# Patient Record
Sex: MALE | Race: ASIAN | NOT HISPANIC OR LATINO
[De-identification: names, ages, dates, MRNs, and addresses within clinical notes are randomized per-mention and may not be internally consistent; named-entity substitution may affect disease eponyms.]

---

## 2019-09-24 ENCOUNTER — APPOINTMENT (OUTPATIENT)
Dept: CARDIOLOGY | Facility: CLINIC | Age: 64
End: 2019-09-24

## 2019-09-24 ENCOUNTER — APPOINTMENT (OUTPATIENT)
Dept: CARDIOLOGY | Facility: CLINIC | Age: 64
End: 2019-09-24
Payer: MEDICARE

## 2019-09-24 VITALS
TEMPERATURE: 97.7 F | RESPIRATION RATE: 17 BRPM | WEIGHT: 189 LBS | DIASTOLIC BLOOD PRESSURE: 82 MMHG | SYSTOLIC BLOOD PRESSURE: 129 MMHG | OXYGEN SATURATION: 95 % | HEART RATE: 72 BPM | BODY MASS INDEX: 27.12 KG/M2

## 2019-09-24 PROCEDURE — 99214 OFFICE O/P EST MOD 30 MIN: CPT

## 2019-09-24 RX ORDER — MELOXICAM 15 MG/1
15 TABLET ORAL
Refills: 0 | Status: ACTIVE | COMMUNITY

## 2019-09-24 RX ORDER — OMEPRAZOLE 40 MG/1
40 CAPSULE, DELAYED RELEASE ORAL
Refills: 0 | Status: ACTIVE | COMMUNITY

## 2019-09-24 RX ORDER — CALCIUM CARBONATE/VITAMIN D3 600 MG-10
TABLET ORAL
Refills: 0 | Status: ACTIVE | COMMUNITY

## 2019-10-30 NOTE — PHYSICAL EXAM
[General Appearance - Well Developed] : well developed [Well Groomed] : well groomed [Normal Appearance] : normal appearance [General Appearance - Well Nourished] : well nourished [No Deformities] : no deformities [General Appearance - In No Acute Distress] : no acute distress [Normal Conjunctiva] : the conjunctiva exhibited no abnormalities [Eyelids - No Xanthelasma] : the eyelids demonstrated no xanthelasmas [Normal Oral Mucosa] : normal oral mucosa [No Oral Pallor] : no oral pallor [No Oral Cyanosis] : no oral cyanosis [Normal Jugular Venous A Waves Present] : normal jugular venous A waves present [Normal Jugular Venous V Waves Present] : normal jugular venous V waves present [No Jugular Venous Marcus A Waves] : no jugular venous marcus A waves [Respiration, Rhythm And Depth] : normal respiratory rhythm and effort [Exaggerated Use Of Accessory Muscles For Inspiration] : no accessory muscle use [Auscultation Breath Sounds / Voice Sounds] : lungs were clear to auscultation bilaterally [Heart Rate And Rhythm] : heart rate and rhythm were normal [Arterial Pulses Normal] : the arterial pulses were normal [Heart Sounds] : normal S1 and S2 [Edema] : no peripheral edema present [Abdomen Soft] : soft [Abdomen Tenderness] : non-tender [Abdomen Mass (___ Cm)] : no abdominal mass palpated [Abnormal Walk] : normal gait [Gait - Sufficient For Exercise Testing] : the gait was sufficient for exercise testing [Nail Clubbing] : no clubbing of the fingernails [Cyanosis, Localized] : no localized cyanosis [Petechial Hemorrhages (___cm)] : no petechial hemorrhages [Oriented To Time, Place, And Person] : oriented to person, place, and time [] : no ischemic changes [Affect] : the affect was normal [Mood] : the mood was normal [No Anxiety] : not feeling anxious [FreeTextEntry1] : 2/6 NANDA at apex and LLSB.

## 2019-10-30 NOTE — DISCUSSION/SUMMARY
[FreeTextEntry1] : 64-year-old male smoker with HTN and HLD presents for followup.  Patient was last seen on 3/31/16 for evaluation of chest discomfort.  Patient underwent a stress echo and completed 5 minutes of Winston protocol. There were upsloping ST depressions on ECG but there was no echocardiographic evidence of ischemia. He underwent a CXR and it showed left hilar nodular opacity.  He then underwent a chest CT and it showed multiple small nodules, likely benign.  Patient reports L lower CP that felt like a spasm on 9/21/19 lasting ten seconds.  He felt that he was unable to talk in the duration.  Patient reports another vague spasm on 9/23/19 and chest tightness.  Patient reports feeling weak and diaphoretic.  Patient denies palpitations.\par \par (1) Chest discomfort, non-exertional - I advised patient to undergo an echocardiogram and a treadmill stress test but patient did not show for the tests.  \par \par (2) Followup - pending echo and stress test when patient is ready.

## 2019-10-30 NOTE — HISTORY OF PRESENT ILLNESS
[FreeTextEntry1] : 64-year-old male smoker with HTN and HLD presents for followup.  Patient was last seen on 3/31/16 for evaluation of chest discomfort.  Patient underwent a stress echo and completed 5 minutes of Winston protocol. There were upsloping ST depressions on ECG but there was no echocardiographic evidence of ischemia. He underwent a CXR and it showed left hilar nodular opacity.  He then underwent a chest CT and it showed multiple small nodules, likely benign.  Patient reports L lower CP that felt like a spasm on 9/21/19 lasting ten seconds.  He felt that he was unable to talk in the duration.  Patient reports another vague spasm on 9/23/19 and chest tightness.  Patient reports feeling weak and diaphoretic.  Patient denies palpitations.\par \par \par \par (1) Chest discomfort, non-exertional - Patient underwent a stress echo and completed 5 minutes of Winston protocol. There were upsloping ST depressions on ECG but there was no echocardiographic evidence of ischemia. He underwent a CXR and it showed left hilar nodular opacity. He then underwent a chest CT and it showed multiple small nodules, likely benign, but a follow CT in 6 months was recommended.\par \par (2) Abnormal ECG, showing possible delta waves - Patient may have WPW. He is asymptomatic. His delta waves disappeared at peak treadmill stress, suggesting a favorable prognosis. \par \par (3) Followup - repeat chest CT in 6 months.\par \par \par He reports that for the past month he has experienced 2 to 3 episodes of left-sided chest discomfort while bending down, described as cramps in the chest, associated with difficulty breathing, lasting minutes, not tender to touch, improved with mild rubbing on the chest. He denies CP, SOB or palpitation at baseline. He reports no history of syncope.

## 2020-12-02 ENCOUNTER — APPOINTMENT (OUTPATIENT)
Dept: UROLOGY | Facility: CLINIC | Age: 65
End: 2020-12-02

## 2023-09-01 ENCOUNTER — APPOINTMENT (OUTPATIENT)
Dept: CARDIOLOGY | Facility: CLINIC | Age: 68
End: 2023-09-01
Payer: MEDICARE

## 2023-09-01 VITALS
BODY MASS INDEX: 28.7 KG/M2 | RESPIRATION RATE: 16 BRPM | SYSTOLIC BLOOD PRESSURE: 120 MMHG | WEIGHT: 200 LBS | OXYGEN SATURATION: 96 % | HEART RATE: 87 BPM | DIASTOLIC BLOOD PRESSURE: 79 MMHG | TEMPERATURE: 96.8 F

## 2023-09-01 DIAGNOSIS — I10 ESSENTIAL (PRIMARY) HYPERTENSION: ICD-10-CM

## 2023-09-01 DIAGNOSIS — R94.31 ABNORMAL ELECTROCARDIOGRAM [ECG] [EKG]: ICD-10-CM

## 2023-09-01 DIAGNOSIS — R07.89 OTHER CHEST PAIN: ICD-10-CM

## 2023-09-01 PROCEDURE — 99214 OFFICE O/P EST MOD 30 MIN: CPT

## 2023-09-19 ENCOUNTER — APPOINTMENT (OUTPATIENT)
Dept: CARDIOLOGY | Facility: CLINIC | Age: 68
End: 2023-09-19
Payer: MEDICARE

## 2023-09-19 VITALS
TEMPERATURE: 97.8 F | DIASTOLIC BLOOD PRESSURE: 82 MMHG | OXYGEN SATURATION: 97 % | SYSTOLIC BLOOD PRESSURE: 153 MMHG | RESPIRATION RATE: 18 BRPM | HEART RATE: 84 BPM

## 2023-09-19 PROCEDURE — 93306 TTE W/DOPPLER COMPLETE: CPT

## 2024-01-29 NOTE — REASON FOR VISIT
[FreeTextEntry1] : 68 year-old male smoker with HTN, HLD, possible delta waves on ECG, presents for followup.    Patient was last seen on 9/24/19 - Patient reports L lower CP that felt like a spasm on 9/21/19 lasting ten seconds.  He felt that he was unable to talk in the duration.  Patient reports another vague spasm on 9/23/19 and chest tightness.  Patient reports feeling weak and diaphoretic.  Patient denies palpitations.  He is on ASA 81 mg, Mobic 15 mg, Omeprazole 40 mg, and Omega 3.  I advised patient to undergo an echocardiogram and a treadmill stress test which he did not do.    He is on ASA 81 mg, Simvastatin 40 mg, Vascepa for cardioprotection.  He is on Lisinopril HCTZ 10-12.5 mg for HTN.  Patient underwent a stress echo 3/31/16 and completed 5 minutes of Winston protocol. There were upsloping ST depressions on ECG but there was no echocardiographic evidence of ischemia. He underwent a CXR and it showed left hilar nodular opacity.  He then underwent a chest CT and it showed multiple small nodules, likely benign.

## 2024-01-29 NOTE — HISTORY OF PRESENT ILLNESS
[FreeTextEntry1] : 9/1/23 -  Patient reports that for the past few months he has been experiencing left-sided CP, described as soreness but also at times sharp pain lasting seconds, not related to exertion.  Patient reports mild SOB.  Patient denies palpitations.  Patient denies h/o syncope.  I advised patient to undergo an echocardiogram.  I advised patient to undergo a CTA of coronary arteries.  Patient underwent an echocardiogram and it showed normal LV function without significant valvular pathology.  CTA of the coronary arteries showed mild CAD with calcium score 165.  I advised patient to continue Simvastatin 40 mg and Vascepa to target LDL<100.  (1) Chest discomfort, non-exertional - Patient underwent an echocardiogram and it showed normal LV function without significant valvular pathology.  CTA of the coronary arteries showed mild CAD with calcium score 165.  His symptom may be neuropathic in etiology.  I advised patient to continue Simvastatin 40 mg and Vascepa to target LDL<100.    (2) HTN - His BP was good.  I advised patient to continue Lisinopril HCTZ 10-12.5 mg.  (3) Followup -  1 year.   9/24/19 - Patient reports L lower CP that felt like a spasm on 9/21/19 lasting ten seconds.  He felt that he was unable to talk in the duration.  Patient reports another vague spasm on 9/23/19 and chest tightness.  Patient reports feeling weak and diaphoretic.  Patient denies palpitations.  He is on ASA 81 mg, Mobic 15 mg, Omeprazole 40 mg, and Omega 3.  I advised patient to undergo an echocardiogram and a treadmill stress test which he did not do.

## 2024-01-29 NOTE — PHYSICAL EXAM
[General Appearance - Well Developed] : well developed [Normal Appearance] : normal appearance [Well Groomed] : well groomed [General Appearance - Well Nourished] : well nourished [No Deformities] : no deformities [General Appearance - In No Acute Distress] : no acute distress [Normal Conjunctiva] : the conjunctiva exhibited no abnormalities [Eyelids - No Xanthelasma] : the eyelids demonstrated no xanthelasmas [Normal Oral Mucosa] : normal oral mucosa [No Oral Pallor] : no oral pallor [No Oral Cyanosis] : no oral cyanosis [Normal Jugular Venous A Waves Present] : normal jugular venous A waves present [Normal Jugular Venous V Waves Present] : normal jugular venous V waves present [No Jugular Venous Marcus A Waves] : no jugular venous marcus A waves [Respiration, Rhythm And Depth] : normal respiratory rhythm and effort [Exaggerated Use Of Accessory Muscles For Inspiration] : no accessory muscle use [Auscultation Breath Sounds / Voice Sounds] : lungs were clear to auscultation bilaterally [Heart Rate And Rhythm] : heart rate and rhythm were normal [Heart Sounds] : normal S1 and S2 [Arterial Pulses Normal] : the arterial pulses were normal [Edema] : no peripheral edema present [Abdomen Soft] : soft [Abdomen Tenderness] : non-tender [Abdomen Mass (___ Cm)] : no abdominal mass palpated [Abnormal Walk] : normal gait [Gait - Sufficient For Exercise Testing] : the gait was sufficient for exercise testing [Nail Clubbing] : no clubbing of the fingernails [Cyanosis, Localized] : no localized cyanosis [Petechial Hemorrhages (___cm)] : no petechial hemorrhages [] : no ischemic changes [Oriented To Time, Place, And Person] : oriented to person, place, and time [Affect] : the affect was normal [Mood] : the mood was normal [No Anxiety] : not feeling anxious [FreeTextEntry1] : 2/6 NANDA at apex and LLSB.